# Patient Record
Sex: MALE | URBAN - METROPOLITAN AREA
[De-identification: names, ages, dates, MRNs, and addresses within clinical notes are randomized per-mention and may not be internally consistent; named-entity substitution may affect disease eponyms.]

---

## 2019-07-01 ENCOUNTER — APPOINTMENT (OUTPATIENT)
Age: 29
Setting detail: DERMATOLOGY
End: 2019-07-21

## 2019-07-01 DIAGNOSIS — L30.9 DERMATITIS, UNSPECIFIED: ICD-10-CM

## 2019-07-01 PROCEDURE — OTHER MIPS QUALITY: OTHER

## 2019-07-01 PROCEDURE — OTHER COUNSELING: OTHER

## 2019-07-01 PROCEDURE — OTHER BIOPSY BY PUNCH METHOD: OTHER

## 2019-07-01 PROCEDURE — OTHER PRESCRIPTION: OTHER

## 2019-07-01 RX ORDER — TRIAMCINOLONE ACETONIDE 1 MG/G
APPLY THIN COAT CREAM TOPICAL BID
Qty: 80 | Refills: 2 | Status: ERX | COMMUNITY
Start: 2019-07-01

## 2019-07-01 ASSESSMENT — LOCATION ZONE DERM
LOCATION ZONE: AXILLAE
LOCATION ZONE: TRUNK

## 2019-07-01 ASSESSMENT — LOCATION SIMPLE DESCRIPTION DERM
LOCATION SIMPLE: RIGHT AXILLARY VAULT
LOCATION SIMPLE: CHEST
LOCATION SIMPLE: RIGHT LOWER BACK

## 2019-07-01 ASSESSMENT — LOCATION DETAILED DESCRIPTION DERM
LOCATION DETAILED: RIGHT AXILLARY VAULT
LOCATION DETAILED: RIGHT LATERAL INFERIOR CHEST
LOCATION DETAILED: RIGHT INFERIOR MEDIAL LOWER BACK

## 2019-07-01 NOTE — PROCEDURE: MIPS QUALITY
Detail Level: Detailed
Quality 265: Biopsy Follow-Up: Biopsy results reviewed, communicated, tracked, and documented
Quality 130: Documentation Of Current Medications In The Medical Record: Current Medications Documented
Quality 226: Preventive Care And Screening: Tobacco Use: Screening And Cessation Intervention: Patient screened for tobacco and never smoked
Quality 131: Pain Assessment And Follow-Up: Pain assessment using a standardized tool is documented as negative, no follow-up plan required
Quality 110: Preventive Care And Screening: Influenza Immunization: Influenza Immunization previously received during influenza season
Quality 431: Preventive Care And Screening: Unhealthy Alcohol Use - Screening: Patient screened for unhealthy alcohol use using a single question and scores less than 2 times per year

## 2019-07-01 NOTE — PROCEDURE: COUNSELING
Detail Level: Zone
Patient Specific Counseling (Will Not Stick From Patient To Patient): Recommended to take QD Zyrtec

## 2019-07-01 NOTE — PROCEDURE: BIOPSY BY PUNCH METHOD
Additional Anesthesia Volume In Cc (Will Not Render If 0): 0
Billing Type: Third-Party Bill
Render Post-Care Instructions In Note?: yes
Render Path Notes In Note?: No
Hemostasis: None
Home Suture Removal Text: Patient was provided a home suture removal kit and will remove their sutures at home.  If they have any questions or difficulties they will call the office.
Biopsy Type: H and E
Punch Size In Mm: 3
Anesthesia Type: 1% lidocaine with epinephrine
Notification Instructions: Patient will be notified of biopsy results. However, patient instructed to call the office if not contacted within 2 weeks.
Wound Care: Petrolatum
Body Location Override (Optional - Billing Will Still Be Based On Selected Body Map Location If Applicable): Right axilla
Epidermal Sutures: 5-0 Surgipro
Detail Level: Detailed
Post-Care Instructions: I reviewed with the patient in detail post-care instructions. Patient is to keep the biopsy site dry overnight, and then apply bacitracin twice daily until healed. Patient may apply hydrogen peroxide soaks to remove any crusting.
Anesthesia Volume In Cc (Will Not Render If 0): 1.5
Consent: Written consent was obtained and risks were reviewed including but not limited to scarring, infection, bleeding, scabbing, incomplete removal, nerve damage and allergy to anesthesia.
Dressing: pressure dressing with telfa
Suture Removal: 12 days

## 2019-07-19 ENCOUNTER — APPOINTMENT (OUTPATIENT)
Age: 29
Setting detail: DERMATOLOGY
End: 2019-07-20

## 2019-07-19 DIAGNOSIS — Z48.02 ENCOUNTER FOR REMOVAL OF SUTURES: ICD-10-CM

## 2019-07-19 DIAGNOSIS — L30.9 DERMATITIS, UNSPECIFIED: ICD-10-CM

## 2019-07-19 PROCEDURE — OTHER COUNSELING: OTHER

## 2019-07-19 PROCEDURE — OTHER SUTURE REMOVAL (GLOBAL PERIOD): OTHER

## 2019-07-19 ASSESSMENT — LOCATION DETAILED DESCRIPTION DERM
LOCATION DETAILED: RIGHT ANTERIOR AXILLA
LOCATION DETAILED: RIGHT AXILLARY VAULT

## 2019-07-19 ASSESSMENT — LOCATION SIMPLE DESCRIPTION DERM
LOCATION SIMPLE: RIGHT AXILLA
LOCATION SIMPLE: RIGHT AXILLARY VAULT

## 2019-07-19 ASSESSMENT — PAIN INTENSITY VAS: HOW INTENSE IS YOUR PAIN 0 BEING NO PAIN, 10 BEING THE MOST SEVERE PAIN POSSIBLE?: NO PAIN

## 2019-07-19 ASSESSMENT — LOCATION ZONE DERM: LOCATION ZONE: AXILLAE

## 2019-07-19 ASSESSMENT — SEVERITY ASSESSMENT: SEVERITY: ALMOST CLEAR

## 2019-07-19 NOTE — PROCEDURE: SUTURE REMOVAL (GLOBAL PERIOD)
Body Location Override (Optional - Billing Will Still Be Based On Selected Body Map Location If Applicable): right axilla
Detail Level: Detailed
Add 67584 Cpt? (Important Note: In 2017 The Use Of 93891 Is Being Tracked By Cms To Determine Future Global Period Reimbursement For Global Periods): no

## 2019-07-19 NOTE — PROCEDURE: COUNSELING
Patient Specific Counseling (Will Not Stick From Patient To Patient): Patient instructed to monitor his diet and how that effects the rash he develops. Patient advised to include more carbohydrates into his diet. The patient was advised to use topical steroids if the rash occurs again. If the state of the rash worsens the patient should contact our office.
Detail Level: Simple
Detail Level: Zone

## 2019-12-19 ENCOUNTER — TRANSFERRED RECORDS (OUTPATIENT)
Dept: HEALTH INFORMATION MANAGEMENT | Facility: CLINIC | Age: 29
End: 2019-12-19

## 2024-02-15 ENCOUNTER — OFFICE VISIT (OUTPATIENT)
Dept: SURGERY | Facility: CLINIC | Age: 34
End: 2024-02-15
Payer: COMMERCIAL

## 2024-02-15 VITALS
BODY MASS INDEX: 25.06 KG/M2 | HEART RATE: 60 BPM | OXYGEN SATURATION: 98 % | HEIGHT: 72 IN | DIASTOLIC BLOOD PRESSURE: 82 MMHG | WEIGHT: 185 LBS | SYSTOLIC BLOOD PRESSURE: 124 MMHG | RESPIRATION RATE: 16 BRPM

## 2024-02-15 DIAGNOSIS — K40.90 RIGHT INGUINAL HERNIA: Primary | ICD-10-CM

## 2024-02-15 PROCEDURE — 99204 OFFICE O/P NEW MOD 45 MIN: CPT | Performed by: SURGERY

## 2024-02-15 NOTE — LETTER
March 7, 2024          Calvin Mccabe MD  [facility]   [address]  [place] [address]      RE:   Travis Ivey 1990      Dear Colleague,    Thank you for referring your patient, Travis Ivey, to [facility] Please see a copy of my visit note below.     Pamela Newton [phone]  Consultation requested by: Calvin Mccabe MD     HPI: Is a 33-year-old gentleman referred by the above provider for consultation regarding right inguinal hernia.  He has been experiencing intermittent right groin pain for some time associated with certain physical activities.  He has a distant history of an imaging study which showed a right inguinal hernia.  He describes no signs or symptoms that suggest incarceration or strangulation.  No GI or bowel obstruction symptoms.     PMH:   has no past medical history on file.  PSH:    has no past surgical history on file.  Social History:   reports that he has never smoked. He has never used smokeless tobacco.  Family History:  family history is not on file.  Medications/Allergies: Home medications and allergies reviewed.     ROS:  The 10 point Review of Systems is negative other than noted in the HPI.     Physical Exam:  /82   Pulse 60   Resp 16   Ht 1.829 m (6')   Wt 83.9 kg (185 lb)   SpO2 98%   BMI 25.09 kg/m    GENERAL: Generally appears well.  Psych: Alert and Oriented.  Normal affect  Eyes: Sclera clear  Respiratory:  Lungs clear to ausculation bilaterally with good air excursion  Cardiovascular:  Regular Rate and Rhythm with no murmurs gallops or rubs, normal peripheral pulses  GI: Abdomen Non Distended Soft Non-Tender  No hernias palpated..  Groin- I examined the patient in both the standing and supine positions. Right Groin- Small inguinal hernia.  Hernia was easily reduciable. Left Groin- No hernia Palpated. No scrotal or testicle abnormalities.  Lymphatic/Hematologic/Immune:  No femoral or  cervical lymphadenopathy.  Integumentary:  No rashes  Neurological: grossly intact      All new lab and imaging data was reviewed.      Impression and Plan:  Patient is a 33 year old male with right inguinal hernia     PLAN: Discussed management options with him.  At present I am not convinced that his pain is related to the hernia that is present and he was made aware of this.  I would however recommend the elective repair of this at a time at a date of his convenience.  I think he would be a good candidate for an outpatient robotic assisted laparoscopic repair.  The procedure was described in detail.  He is going to take this into consideration.  He was encouraged to call back and schedule at his convenience.  I discussed the pathophysiology of hernias and options for repair including laparoscopic VS open.  The risks associated with the procedure including, but not limited to, recurrence, nerve entrapment or injury, persistence of pain, injury to the bowel/bladder, infertility, hematoma, mesh migration, mesh infection, MI, and PE were discussed with the patient. He indicated understanding of the discussion, asked appropriate questions, and provided consent. Signs and symptoms of incarceration were discussed. If these develop in the interim, he promises to call or go straight to the ER. I have provided the patient with an information pamphlet.       Again, thank you for allowing me to participate in the care of your patient.      Sincerely,      Richar Penny MD

## 2024-03-07 NOTE — PROGRESS NOTES
[facility]  Surgery Consultation    Primary care provider:  Pamela Newton [phone]  Consultation requested by: Calvin Mccabe MD    HPI: Is a 33-year-old gentleman referred by the above provider for consultation regarding right inguinal hernia.  He has been experiencing intermittent right groin pain for some time associated with certain physical activities.  He has a distant history of an imaging study which showed a right inguinal hernia.  He describes no signs or symptoms that suggest incarceration or strangulation.  No GI or bowel obstruction symptoms.  All that being said it seems his greatest complaint is discomfort and pain in the subcostal area.  This is present with certain physical activities and bending and twisting.  He feels occasionally as something pops out and is concerned that there may be a hernia in this location.    PMH:   has no past medical history on file.  PSH:    has no past surgical history on file.  Social History:   reports that he has never smoked. He has never used smokeless tobacco.  Family History:  family history is not on file.  Medications/Allergies: Home medications and allergies reviewed.    ROS:  The 10 point Review of Systems is negative other than noted in the HPI.    Physical Exam:  /82   Pulse 60   Resp 16   Ht 1.829 m (6')   Wt 83.9 kg (185 lb)   SpO2 98%   BMI 25.09 kg/m    GENERAL: Generally appears well.  Psych: Alert and Oriented.  Normal affect  Eyes: Sclera clear  Respiratory:  Lungs clear to ausculation bilaterally with good air excursion, area of discomfort is in the subcostal position seems to be associated with the lower floating rib perhaps suggestive of slipping rib syndrome.  Cardiovascular:  Regular Rate and Rhythm with no murmurs gallops or rubs, normal peripheral pulses  GI: Abdomen Non Distended Soft Non-Tender  No hernias palpated..  Groin- I examined the patient in both the standing and supine positions.  Right Groin- Small inguinal hernia.  Hernia was easily reduciable. Left Groin- No hernia Palpated. No scrotal or testicle abnormalities.  Lymphatic/Hematologic/Immune:  No femoral or cervical lymphadenopathy.  Integumentary:  No rashes  Neurological: grossly intact     All new lab and imaging data was reviewed.     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    PLAN: Discussed management options with him.  At present I am not convinced that his pain is related to the hernia that is present and he was made aware of this.  I would however recommend the elective repair of this at a time at a date of his convenience.  I think he would be a good candidate for an outpatient robotic assisted laparoscopic repair.  The procedure was described in detail.  He is going to take this into consideration.  He was encouraged to call back and schedule at his convenience.  Regarding the subcostal symptoms I have suggested that he try to find a provider this willing to evaluate him for slipping rib syndrome.  Could try some manner of physical therapy but I am not certain that this is going to be of benefit.  Perhaps would be best served with avoidance of activities that induce his discomfort.  Would happily make referral to provider willing to accept and evaluate for said condition.  I discussed the pathophysiology of hernias and options for repair including laparoscopic VS open.  The risks associated with the procedure including, but not limited to, recurrence, nerve entrapment or injury, persistence of pain, injury to the bowel/bladder, infertility, hematoma, mesh migration, mesh infection, MI, and PE were discussed with the patient. He indicated understanding of the discussion, asked appropriate questions, and provided consent. Signs and symptoms of incarceration were discussed. If these develop in the interim, he promises to  call or go straight to the ER. I have provided the patient with an information pamphlet.      Thank you very much for this consult.    Richar Penny M.D.  [facility]  [phone]    Please route or send letter to:  Primary Care Provider (PCP) and Referring Provider

## 2024-03-11 ENCOUNTER — TELEPHONE (OUTPATIENT)
Dept: SURGERY | Facility: CLINIC | Age: 34
End: 2024-03-11
Payer: COMMERCIAL

## 2024-03-11 NOTE — TELEPHONE ENCOUNTER
Patient is calling stating he was suppose to hear back regarding next steps after his consult with [facility]P 2/15/24, would like to speak to someone.    Please call    Phone: [phone]   Message ok

## 2024-03-11 NOTE — TELEPHONE ENCOUNTER
"Patient saw Dr. Penny 2/15/24.  Patient reports that he saw Dr. Penny for pain in his rib cage area. \"Intercostal pain.\"  He reports Dr. Penny said he was going to talk to a colleague about \"slipping rib syndrome\" and would contact patient to discuss after that conversation was had. Patient calling today for any updates.    Per dictated office visit note, dx was right inguinal hernia.    Patient reports pain is not in his right groin. He was not even aware that he had a hernia.  Imaging from 2019 (CT abdomen/pelvis) does indicate RIH (Dr. Mccabe).    Patient reports that the reason he saw Dr. Penny was for pain in his rib cage that is present with certain movements/exercises.  Triggers muscle spasms. He reports he showed Dr. Penny images of the muscle spasms.     Informed him that I will discuss this with Dr. Penny and get back to patient as soon as I can.  Dr. Penny is back in clinic on Thursday. Patient aware he may not receive a call back until Thursday.    Call routed to Dr. Zohaib Rios, RN-BSN    "

## 2024-03-14 ENCOUNTER — TELEPHONE (OUTPATIENT)
Dept: OTHER | Facility: CLINIC | Age: 34
End: 2024-03-14
Payer: COMMERCIAL

## 2024-03-14 NOTE — TELEPHONE ENCOUNTER
[facility] VASCULAR [facility]    Who is the name of the provider?:  [facility] NURSE   What is the location you see this provider at/preferred location?: [place]  Person calling / Facility: Travis Ivey  Phone number:  [phone] (home)  Nurse call back needed:  ?     Reason for call:  Patient diagnosed with slipping rib syndrome by Dr Richar Mcelroy, [facility]. [place].    Wondering if [facility] can assist.    Pharmacy location:  Data Unavailable  Outside Imaging: n/a   Can we leave a detailed message on this number?  YES     3/14/2024, 9:30 AM

## 2024-03-14 NOTE — TELEPHONE ENCOUNTER
Returned call to Travis and explained slipped rib is not a diagnosis we see at [facility] and he verbalized understanding.     Dee SMITH, RN    [facility]  Vascular [facility]  Office: [phone]  Fax: [phone]

## 2024-03-14 NOTE — TELEPHONE ENCOUNTER
Patient reports that he found a provider at [facility]: Antonio Storm MD that will see patient for present symptoms.    Patient needs office visit note, as well as referral form from Dr. Penny (discussing the intercostal symptoms) faxed to [facility], before they will allow him to schedule    Will route to Dr. Penny asking him to update dictation at his soonest convenience so that I can get faxed to [facility].    I will call patient once it has been faxed    Angie Rios RN-BSN

## 2024-03-14 NOTE — TELEPHONE ENCOUNTER
Called patient back with Dr. Penny's input:    No updates, we discussed his imaging in office, no one interested in treating slipping rib, nothing further to offer at this time     Suggested patient contact [facility] to see if any providers there deal with slipping rib/intercostal symptoms    Provided him the contact number for that location    He will call PRN    Angie Rios RN-BSN

## 2024-03-15 DIAGNOSIS — M94.0 SLIPPING RIB SYNDROME: Primary | ICD-10-CM

## 2024-03-15 NOTE — TELEPHONE ENCOUNTER
Patient calling back to see if the OV notes have sent?    He would like a call to let him know so he can schedule the appointment.    [phone]  Ok to leave VM

## 2024-03-15 NOTE — TELEPHONE ENCOUNTER
Referral faxed.  Patient notified that office visit note not yet updated. Will fax note once available.    Informed him that referral may be all that is needed to be able to schedule appointment    He will call to schedule    He will call PRN    Angie Rios RN-BSN